# Patient Record
(demographics unavailable — no encounter records)

---

## 2025-07-12 NOTE — PHYSICAL EXAM
[Right] : right elbow [] : normal carrying angle [de-identified] : TTP over ulna shaft  [FreeTextEntry9] : reduced ROM due to pain  [de-identified] : strength deferred

## 2025-07-12 NOTE — HISTORY OF PRESENT ILLNESS
[de-identified] : 07/12/2025: DIANE MUELLER is a 12 year old  Right Hand dominant male complaining of Lt wrist pain.   Patient states pain started: Today Mechanism of Injury: States another player bumped into his hand while playing lacrosse.  Pain scale at rest:  2/10 Pain scale with Activity:  6/10 Pain description: States sharp and achy pain. Denies Numbness/tingling Pain is localized on the wrist. Prior Treatment   Additional details: Plays lacrosse

## 2025-07-12 NOTE — IMAGING
[Right] : right wrist [The fracture is in acceptable alignment. There is progression in healing seen] : The fracture is in acceptable alignment. There is progression in healing seen [FreeTextEntry8] : ulna shaft fracture

## 2025-07-12 NOTE — ASSESSMENT
[FreeTextEntry1] : Sugar tong splint applied.  Use sling for comfort   Non weight bearing right upper extremity/   No sports activities whatsoever.   Keep splint clean and dry.    Patient and father understand if pain becomes severe, patient experiences numbness in hand or arm, hand or arm becomes cold and or pale that they must go to the emergency room immediately.  Follow up for Dr Mendez next week.

## 2025-07-15 NOTE — HISTORY OF PRESENT ILLNESS
[de-identified] :  Patient presenting in office for initial evaluation of left wrist/forearm pain. Patient states on 7/12/2025 he was in a lacrosse game when he was checked in the arm by a lacrosse stick. Which he stopped playing immediately. Patient reports in office with a sling and splint. Patient was seen in [facility] the same day where an ulna fracture was diagnosed, patient was advised to follow up in office to obtain a hard cast. Patient states he is feeling the same and uses Tylenol as needed. He is RHD.

## 2025-07-15 NOTE — PHYSICAL EXAM
[Dorsal Wrist] : dorsal wrist [Volar Wrist] : volar wrist [] : good capillary refill in all fingers [Left] : left wrist [FreeTextEntry3] : skin intact out of unraveling sugartong splint and sling [FreeTextEntry8] : ulna shaft fracture

## 2025-07-15 NOTE — DISCUSSION/SUMMARY
[de-identified] : I reviewed patient's radiographs and discussed his condition and treatment options.  I advised 4-6 week course of immobilization. Well-padded long arm fiberglass cast placed today.  I instructed patient to keep cast clean and dry, avoid scratching or putting anything in the cast.  I provided my contact information to call should the cast get wet or patient have any issues with the cast. Follow up in 2 weeks XRS forearm OOC.  Patient and his parents voiced understanding and agreement with the plan.

## 2025-07-29 NOTE — HISTORY OF PRESENT ILLNESS
[de-identified] :  Patient following up for left ulna fracture. Patient reports occasional pain with lifting something heavy but overall doing well. Patient attests to good cast care.

## 2025-07-29 NOTE — DISCUSSION/SUMMARY
[de-identified] : I reviewed patient's radiographs and discussed his condition and treatment options.  Long arm cast removed and short arm cast placed. Well-padded short arm fiberglass cast placed today.  I instructed patient to keep cast clean and dry, avoid scratching or putting anything in the cast.  I provided my contact information to call should the cast get wet or patient have any issues with the cast. Follow up in 3 weeks XRS OOC.  Patient and his mother voiced understanding and agreement with the plan.

## 2025-07-29 NOTE — PHYSICAL EXAM
[The fracture is in acceptable alignment. There is progression in healing seen] : The fracture is in acceptable alignment. There is progression in healing seen [Left] : left hand [] : ecchymosis [FreeTextEntry3] : skin intact out of long arm cast